# Patient Record
Sex: MALE | Race: WHITE | Employment: UNEMPLOYED | URBAN - METROPOLITAN AREA
[De-identification: names, ages, dates, MRNs, and addresses within clinical notes are randomized per-mention and may not be internally consistent; named-entity substitution may affect disease eponyms.]

---

## 2020-11-23 ENCOUNTER — OFFICE VISIT (OUTPATIENT)
Dept: PODIATRY | Age: 40
End: 2020-11-23
Payer: MEDICARE

## 2020-11-23 VITALS — WEIGHT: 190 LBS | HEIGHT: 72 IN | BODY MASS INDEX: 25.73 KG/M2

## 2020-11-23 PROCEDURE — 1036F TOBACCO NON-USER: CPT | Performed by: PODIATRIST

## 2020-11-23 PROCEDURE — G8484 FLU IMMUNIZE NO ADMIN: HCPCS | Performed by: PODIATRIST

## 2020-11-23 PROCEDURE — 99203 OFFICE O/P NEW LOW 30 MIN: CPT | Performed by: PODIATRIST

## 2020-11-23 PROCEDURE — G8419 CALC BMI OUT NRM PARAM NOF/U: HCPCS | Performed by: PODIATRIST

## 2020-11-23 PROCEDURE — G8427 DOCREV CUR MEDS BY ELIG CLIN: HCPCS | Performed by: PODIATRIST

## 2020-11-23 ASSESSMENT — ENCOUNTER SYMPTOMS
NAUSEA: 0
DIARRHEA: 0
SHORTNESS OF BREATH: 0
BACK PAIN: 0
COLOR CHANGE: 0

## 2020-11-23 NOTE — PROGRESS NOTES
or breaks in the skin noted to the bilateral lower extremities. There is no induration, subcutaneous nodules, or tightening of the skin noted to the bilateral.     Toenails 1-5 of the right foot do not present with thickness, elongation, discoloration, brittleness, subungual debris. Toenails 1-5 of the left foot do not present with thickness, elongation, discoloration, brittleness, subungual debris. Interdigital maceration absent to web spaces 1-4, Bilateral.     There are no preulcerative lesions noted to the right foot. There are no preulcerative lesions noted to the left foot. The skin to the bilateral feet is not thin and shiny. The skin to the bilateral feet is  warm, supple, and dry. Vascular: DP pulse of the right foot is  palpable. DP pulse of the left foot is  palpable. PT pulse of the right foot is  palpable. PT pulse of the left foot is  palpable. CFT is less than 3 secs to the digits of the right foot. CFT is less than 3 secs to the digits of the left foot. There is no edema noted to the bilateral foot or ankle. There is hair growth noted to the digits of the bilateral feet. There are no varicosities noted to the right foot/ankle. There are no varicosities noted to the left foot/ankle. Erythema is absent to the bilateral feet. Neurological: Reflexes are present to the right plantar foot and to the Achilles tendon. Reflexes are present to the left plantar foot and to the Achilles tendon. Epicritic sensation is  intact to the right foot. Epicritic sensation is  intact to the left foot. Musculoskeletal:  Muscle strength is +5/5 to all four muscle groups of the right lower extremity and +5/5 to all four muscle groups of the left lower extremity. There are no areas of subluxation, dislocation, or laxity noted to either lower extremity. Range of motion to the right ankle is  free of pain or grinding.      Range of motion to the left ankle is  free of pain or grinding. Range of motion to the right subtalar joint is  free of pain or grinding. Range of motion to the left subtalar joint is  free of pain or grinding. No abnormalities, asymmetries, or misalignments are seen between the extremities. Weightbearing evaluation does not reveal rearfoot eversion, medial prominence of the talar head, loss of the medial longitudinal arch height, and too many toes sign bilaterally. The lesser digits of the right foot are not contracted. The lesser digits of the left foot are not contracted. There is no prominence noted to the first metatarsal head without abduction of the hallux of the right foot. There is no prominence noted to the first metatarsal head without abduction of the hallux of the left foot. There is no pain with palpation to the plantar medial calcaneal tubercle of the right foot. There is no pain with palpation to the plantar central aspect of the right heel. There is no pain with palpation to the butch pedis of the right foot. There is no pain with medial to lateral compression of the right calcaneus. Tinel's sign is negative to the right tarsal tunnel. There is no erythema, calor, or open lesion noted to the right foot or ankle. Shoe examination was performed. Biomechanical Exam: normal bilaterally. With the patient standing, he bends forward at his waist with his legs extended and states that this is when he experiences pain to his heel. X-ray's taken: AP, Lateral, and Medial Oblique of the right ankle. Findings: No fracture or stress fracture is noted. Asessment: Patient is a 38 y.o. male with:    Diagnosis Orders   1. Tarsal tunnel syndrome of right side  XR ANKLE RIGHT (MIN 3 VIEWS)    Zita Amos MD, Orthopedic Surgery, [place]   2. Pain in right foot  XR ANKLE RIGHT (MIN 3 VIEWS)    Adrian Ellis MD, Orthopedic Surgery, [place]   3.  Lumbosacral radiculopathy  Russ Fountain MD, Orthopedic Surgery, [phone]:  1. Clinical evaluation of the patient. 2.  Patient informed that I believe his pain is due to his back and he was given a referral to orthopedics to evaluate this. 3. Contact office with any questions/problems/concerns. Return if symptoms worsen or fail to improve.    11/23/2020      Zeb Agrawal DPM

## 2020-11-30 ENCOUNTER — OFFICE VISIT (OUTPATIENT)
Dept: ORTHOPEDIC SURGERY | Age: 40
End: 2020-11-30
Payer: MEDICARE

## 2020-11-30 VITALS — TEMPERATURE: 96.8 F | WEIGHT: 190 LBS | HEIGHT: 72 IN | BODY MASS INDEX: 25.73 KG/M2

## 2020-11-30 PROCEDURE — G8427 DOCREV CUR MEDS BY ELIG CLIN: HCPCS | Performed by: PHYSICIAN ASSISTANT

## 2020-11-30 PROCEDURE — 1036F TOBACCO NON-USER: CPT | Performed by: PHYSICIAN ASSISTANT

## 2020-11-30 PROCEDURE — 99203 OFFICE O/P NEW LOW 30 MIN: CPT | Performed by: PHYSICIAN ASSISTANT

## 2020-11-30 PROCEDURE — G8419 CALC BMI OUT NRM PARAM NOF/U: HCPCS | Performed by: PHYSICIAN ASSISTANT

## 2020-11-30 PROCEDURE — G8484 FLU IMMUNIZE NO ADMIN: HCPCS | Performed by: PHYSICIAN ASSISTANT

## 2020-11-30 NOTE — PROGRESS NOTES
321 [facility], 20 [facility] [address], [address] [place] [address] Rd, [address] [facility]           Dept Phone: [phone]           Dept Fax:  [phone] 320 [facility]           Irma Poe          Dept Phone: [phone]           Dept Fax:  [phone]      Chief Compliant:  Chief Complaint   Patient presents with    New Patient     rt foot pain         History of Present Illness: This is a 38 y.o. male who presents to the clinic today for evaluation of 3-month history of right foot pain. Patient notes a \"stabbing \"pain to the medial aspect of the right foot just distal to the medial malleolus. He states this is typically only present when bending over at the waist with a extended knee. He states he is able to modify how he bends over such as bending his knee to improve the pain. However he states when the pain is onset he rates it as a 10/10 and states it is \"debilitating\". Patient denies any injury or trauma prior to the onset of his pain. He does note intermittent back pain however he does not have back pain related to this. He denies any numbness or tingling in the right lower extremity or foot itself. No known wounds or trauma to his knowledge. Patient was evaluated by his podiatrist, Dr. Clinton Carlson for this there was a question of lumbosacral radiculopathy versus tarsal tunnel syndrome and was referred here for further orthopedic evaluation. Patient denies any chronic back problems. He has never had any lumbar or right lower extremity surgeries. Patient is not currently taking any medication, physical therapy or had any injections for this problem. Past History:  No current outpatient medications on file.   No Known Allergies  Social History     Socioeconomic History    Marital status: Single     Spouse name: Not on file    Number of children: Not on file    Years of education: Not on file    Highest education level: Not on file   Occupational History    Not on file   Social Needs    Financial resource strain: Not on file    Food insecurity     Worry: Not on file     Inability: Not on file    Transportation needs     Medical: Not on file     Non-medical: Not on file   Tobacco Use    Smoking status: Never Smoker    Smokeless tobacco: Never Used   Substance and Sexual Activity    Alcohol use: No    Drug use: No    Sexual activity: Not on file   Lifestyle    Physical activity     Days per week: Not on file     Minutes per session: Not on file    Stress: Not on file   Relationships    Social connections     Talks on phone: Not on file     Gets together: Not on file     Attends Tenriism service: Not on file     Active member of club or organization: Not on file     Attends meetings of clubs or organizations: Not on file     Relationship status: Not on file    Intimate partner violence     Fear of current or ex partner: Not on file     Emotionally abused: Not on file     Physically abused: Not on file     Forced sexual activity: Not on file   Other Topics Concern    Not on file   Social History Narrative    Not on file     No past medical history on file. No past surgical history on file. No family history on file. Review of Systems   Constitutional: Negative for fever, chills, sweats. Eyes: Negative for changes in vision, or pain. HENT: Negative for ear ache, epistaxis, or sore throat. Respiratory/Cardio: Negative for Chest pain, palpitations, SOB, or cough. Gastrointestinal: Negative for abdominal pain, N/V/D. Genitourinary: Negative for dysuria, frequency, urgency, or hematuria. Neurological: Negative for headache, numbness, or weakness. Integumentary: Negative for rash, itching, laceration, or abrasion.    Musculoskeletal: Positive for New Patient (rt foot pain )       Physical Exam:  Constitutional: Patient is oriented to person, place, and time. Patient appears well-developed and well nourished. HENT: Negative otherwise noted  Head: Normocephalic and Atraumatic  Nose: Normal  Eyes: Conjunctivae and EOM are normal  Neck: Normal range of motion Neck supple. Respiratory/Cardio: Effort normal. No respiratory distress. Musculoskeletal:    Gait:  Normal. The patient can bear weight on the injured extremity. Tenderness:   absent   Edema:   absent. Back ROM:  flexion 100   Extension:   +30   Lateral Rotation:  90/90   Lateral Bendin/50   Leg Lengths:   Equal   Atrophy:    is absent   Pulses:  2+ and symmetric   Strength:  abductor 5/5; quadraceps 5/5; hamstrings 5/5; adductors 5/5; iliopsoas 5/5, anterior tibial 5/5; gastrosolues 5/5; EHL 5/5; peroneal posterior tibial 5/5   Straight Leg Raise:  normal/normal   Patellar Reflexes:  2+ bilaterally   Ankle Reflexes:  2+ bilaterally     Right Foot:  right Ankle/Foot        Tenderness: None   Swelling: None       Range of Motion:      Dorsiflexion: 25     Plantar Flexion: 40     Subtalar Inversion: 30     Eversion: 20       Muscle Strength:      Dorsiflexion:  5/5     Plantar Flexion:  5/5     Anterior Tibial:  5/5     Gastrosoleus:  5/5     Posterior Tibial:  5/5     Peroneal muscle:  5/5       Anterior Drawer:  Negative   Varus Tilt:  Negative     Sensation intact to light touch in all distal dermatomes. Neurological: Patient is alert and oriented to person, place, and time. Normal strenght. No sensory deficit. Skin: Skin is warm and dry  Psychiatric: Behavior is normal. Thought content normal.  Nursing note and vitals reviewed. Labs and Imaging:     XR taken today:  No results found. X-rays taken in clinic today and preliminarily reviewed by me:  AP and lumbar spine Demonstrate normal lumbar lordosis and osseous anatomy. There is no evidence of significant degenerative changes, spondylitic deformity or compression deformity.   Unremarkable 2 views of the lumbar spine. Hip joints are visualized on AP view and appear within normal limits. Orders Placed This Encounter   Procedures    XR LUMBAR SPINE (2-3 VIEWS)     Standing Status:   Future     Standing Expiration Date:   11/30/2021    EMG     Standing Status:   Future     Standing Expiration Date:   11/30/2021     Order Specific Question:   Which body part? Answer:   right lower extremity       Assessment and Plan:  1. Right lumbar radiculopathy      Differential:   Differential includes lumbosacral radiculopathy, peripheral neuropathy, tarsal tunnel syndrome, ankle sprain, foot/ankle arthritis. PLAN:  This is a 38 y.o. male who presents to the clinic today for evaluation of intermittent right foot pain. 1.  Patient was referred here for evaluation by his podiatrist due to question of lumbosacral radiculopathy based on his right foot pain. 2.  Patient has a negative lumbar examination today and negative lumbar radiographs taken in clinic today. 3.  Recommend further diagnostic evaluation with an EMG of the right lower extremity to evaluate for possible lumbosacral radiculopathy versus tibial neuropathy. Please note that this chart was generated using voice recognition Dragon dictation software. Although every effort was made to ensure the accuracy of this automated transcription, some errors in transcription may have occurred.

## 2021-01-11 DIAGNOSIS — M54.17 LUMBOSACRAL RADICULOPATHY: Primary | ICD-10-CM

## 2021-01-15 ENCOUNTER — HOSPITAL ENCOUNTER (OUTPATIENT)
Age: 41
Discharge: HOME | End: 2021-01-15
Payer: MEDICARE

## 2021-01-15 ENCOUNTER — HOSPITAL ENCOUNTER (OUTPATIENT)
Age: 41
Discharge: HOME | End: 2021-01-17
Payer: MEDICARE

## 2021-01-15 ENCOUNTER — HOSPITAL ENCOUNTER (OUTPATIENT)
Dept: GENERAL RADIOLOGY | Age: 41
Discharge: HOME | End: 2021-01-17
Payer: MEDICARE

## 2021-01-15 DIAGNOSIS — M50.30 DDD (DEGENERATIVE DISC DISEASE), CERVICAL: ICD-10-CM

## 2021-01-15 DIAGNOSIS — M54.50 LOW BACK PAIN, UNSPECIFIED BACK PAIN LATERALITY, UNSPECIFIED CHRONICITY, UNSPECIFIED WHETHER SCIATICA PRESENT: ICD-10-CM

## 2021-01-15 LAB
ESTIMATED AVERAGE GLUCOSE: 111 MG/DL
FOLATE: 8.2 NG/ML
HBA1C MFR BLD: 5.5 % (ref 4–6)
RHEUMATOID FACTOR: <10 IU/ML
SEDIMENTATION RATE, ERYTHROCYTE: 5 MM (ref 0–15)
TSH SERPL DL<=0.05 MIU/L-ACNC: 3.92 MIU/L (ref 0.3–5)
VITAMIN B-12: 239 PG/ML (ref 232–1245)

## 2021-01-15 PROCEDURE — 72114 X-RAY EXAM L-S SPINE BENDING: CPT

## 2021-01-15 PROCEDURE — 85652 RBC SED RATE AUTOMATED: CPT

## 2021-01-15 PROCEDURE — 82746 ASSAY OF FOLIC ACID SERUM: CPT

## 2021-01-15 PROCEDURE — 84155 ASSAY OF PROTEIN SERUM: CPT

## 2021-01-15 PROCEDURE — 36415 COLL VENOUS BLD VENIPUNCTURE: CPT

## 2021-01-15 PROCEDURE — 82607 VITAMIN B-12: CPT

## 2021-01-15 PROCEDURE — 84443 ASSAY THYROID STIM HORMONE: CPT

## 2021-01-15 PROCEDURE — 84165 PROTEIN E-PHORESIS SERUM: CPT

## 2021-01-15 PROCEDURE — 86038 ANTINUCLEAR ANTIBODIES: CPT

## 2021-01-15 PROCEDURE — 83036 HEMOGLOBIN GLYCOSYLATED A1C: CPT

## 2021-01-15 PROCEDURE — 86431 RHEUMATOID FACTOR QUANT: CPT

## 2021-01-18 LAB — ANTI-NUCLEAR ANTIBODY (ANA): NEGATIVE

## 2021-01-19 LAB
ALBUMIN (CALCULATED): 4.5 G/DL (ref 3.2–5.2)
ALBUMIN PERCENT: 62 % (ref 45–65)
ALPHA 1 PERCENT: 2 % (ref 3–6)
ALPHA 2 PERCENT: 8 % (ref 6–13)
ALPHA-1-GLOBULIN: 0.2 G/DL (ref 0.1–0.4)
ALPHA-2-GLOBULIN: 0.6 G/DL (ref 0.5–0.9)
BETA GLOBULIN: 0.8 G/DL (ref 0.5–1.1)
BETA PERCENT: 11 % (ref 11–19)
GAMMA GLOBULIN %: 16 % (ref 9–20)
GAMMA GLOBULIN: 1.2 G/DL (ref 0.5–1.5)
PATHOLOGIST: ABNORMAL
PROTEIN ELECTROPHORESIS, SERUM: ABNORMAL
TOTAL PROT. SUM,%: 99 % (ref 98–102)
TOTAL PROT. SUM: 7.3 G/DL (ref 6.3–8.2)
TOTAL PROTEIN: 7.3 G/DL (ref 6.4–8.3)

## 2021-03-24 ENCOUNTER — HOSPITAL ENCOUNTER (OUTPATIENT)
Dept: GENERAL RADIOLOGY | Age: 41
Discharge: HOME | End: 2021-03-26
Payer: MEDICARE

## 2021-03-24 ENCOUNTER — HOSPITAL ENCOUNTER (OUTPATIENT)
Age: 41
Discharge: HOME | End: 2021-03-26
Payer: MEDICARE

## 2021-03-24 ENCOUNTER — HOSPITAL ENCOUNTER (OUTPATIENT)
Age: 41
Discharge: HOME | End: 2021-03-24
Payer: MEDICARE

## 2021-03-24 DIAGNOSIS — R07.9 CHEST PAIN, UNSPECIFIED TYPE: ICD-10-CM

## 2021-03-24 LAB
ABSOLUTE EOS #: 0.8 K/UL (ref 0–0.4)
ABSOLUTE IMMATURE GRANULOCYTE: ABNORMAL K/UL (ref 0–0.3)
ABSOLUTE LYMPH #: 3.4 K/UL (ref 1–4.8)
ABSOLUTE MONO #: 0.6 K/UL (ref 0.1–1.3)
ALBUMIN SERPL-MCNC: 4 G/DL (ref 3.5–5.2)
ALBUMIN/GLOBULIN RATIO: ABNORMAL (ref 1–2.5)
ALP BLD-CCNC: 86 U/L (ref 40–129)
ALT SERPL-CCNC: 36 U/L (ref 5–41)
ANION GAP SERPL CALCULATED.3IONS-SCNC: 10 MMOL/L (ref 9–17)
AST SERPL-CCNC: 26 U/L
BASOPHILS # BLD: 1 % (ref 0–2)
BASOPHILS ABSOLUTE: 0.1 K/UL (ref 0–0.2)
BILIRUB SERPL-MCNC: 0.24 MG/DL (ref 0.3–1.2)
BILIRUBIN URINE: NEGATIVE
BUN BLDV-MCNC: 14 MG/DL (ref 6–20)
BUN/CREAT BLD: ABNORMAL (ref 9–20)
CALCIUM SERPL-MCNC: 8.8 MG/DL (ref 8.6–10.4)
CHLORIDE BLD-SCNC: 106 MMOL/L (ref 98–107)
CHOLESTEROL/HDL RATIO: 6.5
CHOLESTEROL: 194 MG/DL
CO2: 24 MMOL/L (ref 20–31)
COLOR: YELLOW
COMMENT UA: NORMAL
CREAT SERPL-MCNC: 0.84 MG/DL (ref 0.7–1.2)
D-DIMER QUANTITATIVE: <0.27 MG/L FEU (ref 0–0.59)
DIFFERENTIAL TYPE: ABNORMAL
EKG ATRIAL RATE: 59 BPM
EKG P AXIS: 57 DEGREES
EKG P-R INTERVAL: 148 MS
EKG Q-T INTERVAL: 396 MS
EKG QRS DURATION: 88 MS
EKG QTC CALCULATION (BAZETT): 392 MS
EKG R AXIS: 70 DEGREES
EKG T AXIS: 65 DEGREES
EKG VENTRICULAR RATE: 59 BPM
EOSINOPHILS RELATIVE PERCENT: 8 % (ref 0–4)
GFR AFRICAN AMERICAN: >60 ML/MIN
GFR NON-AFRICAN AMERICAN: >60 ML/MIN
GFR SERPL CREATININE-BSD FRML MDRD: ABNORMAL ML/MIN/{1.73_M2}
GFR SERPL CREATININE-BSD FRML MDRD: ABNORMAL ML/MIN/{1.73_M2}
GLUCOSE BLD-MCNC: 115 MG/DL (ref 70–99)
GLUCOSE URINE: NEGATIVE
HCT VFR BLD CALC: 44.1 % (ref 41–53)
HDLC SERPL-MCNC: 30 MG/DL
HEMOGLOBIN: 14.9 G/DL (ref 13.5–17.5)
IMMATURE GRANULOCYTES: ABNORMAL %
KETONES, URINE: NEGATIVE
LDL CHOLESTEROL: 101 MG/DL (ref 0–130)
LEUKOCYTE ESTERASE, URINE: NEGATIVE
LYMPHOCYTES # BLD: 32 % (ref 24–44)
MCH RBC QN AUTO: 30.8 PG (ref 26–34)
MCHC RBC AUTO-ENTMCNC: 33.9 G/DL (ref 31–37)
MCV RBC AUTO: 91.1 FL (ref 80–100)
MONOCYTES # BLD: 6 % (ref 1–7)
NITRITE, URINE: NEGATIVE
NRBC AUTOMATED: ABNORMAL PER 100 WBC
PDW BLD-RTO: 13.1 % (ref 11.5–14.9)
PH UA: 5.5 (ref 5–8)
PLATELET # BLD: 276 K/UL (ref 150–450)
PLATELET ESTIMATE: ABNORMAL
PMV BLD AUTO: 7.4 FL (ref 6–12)
POTASSIUM SERPL-SCNC: 4.2 MMOL/L (ref 3.7–5.3)
PROTEIN UA: NEGATIVE
RBC # BLD: 4.85 M/UL (ref 4.5–5.9)
RBC # BLD: ABNORMAL 10*6/UL
SEG NEUTROPHILS: 53 % (ref 36–66)
SEGMENTED NEUTROPHILS ABSOLUTE COUNT: 5.6 K/UL (ref 1.3–9.1)
SODIUM BLD-SCNC: 140 MMOL/L (ref 135–144)
SPECIFIC GRAVITY UA: 1.02 (ref 1–1.03)
TOTAL PROTEIN: 7.1 G/DL (ref 6.4–8.3)
TRIGL SERPL-MCNC: 317 MG/DL
TROPONIN INTERP: NORMAL
TROPONIN T: NORMAL NG/ML
TROPONIN, HIGH SENSITIVITY: <6 NG/L (ref 0–22)
TSH SERPL DL<=0.05 MIU/L-ACNC: 2.74 MIU/L (ref 0.3–5)
TURBIDITY: CLEAR
URINE HGB: NEGATIVE
UROBILINOGEN, URINE: NORMAL
VLDLC SERPL CALC-MCNC: ABNORMAL MG/DL (ref 1–30)
WBC # BLD: 10.5 K/UL (ref 3.5–11)
WBC # BLD: ABNORMAL 10*3/UL

## 2021-03-24 PROCEDURE — 93010 ELECTROCARDIOGRAM REPORT: CPT | Performed by: INTERNAL MEDICINE

## 2021-03-24 PROCEDURE — 84443 ASSAY THYROID STIM HORMONE: CPT

## 2021-03-24 PROCEDURE — 71046 X-RAY EXAM CHEST 2 VIEWS: CPT

## 2021-03-24 PROCEDURE — 93005 ELECTROCARDIOGRAM TRACING: CPT | Performed by: INTERNAL MEDICINE

## 2021-03-24 PROCEDURE — 85379 FIBRIN DEGRADATION QUANT: CPT

## 2021-03-24 PROCEDURE — 84484 ASSAY OF TROPONIN QUANT: CPT

## 2021-03-24 PROCEDURE — 80061 LIPID PANEL: CPT

## 2021-03-24 PROCEDURE — 85025 COMPLETE CBC W/AUTO DIFF WBC: CPT

## 2021-03-24 PROCEDURE — 36415 COLL VENOUS BLD VENIPUNCTURE: CPT

## 2021-03-24 PROCEDURE — 81003 URINALYSIS AUTO W/O SCOPE: CPT

## 2021-03-24 PROCEDURE — 80053 COMPREHEN METABOLIC PANEL: CPT

## 2021-04-05 ENCOUNTER — HOSPITAL ENCOUNTER (OUTPATIENT)
Dept: NON INVASIVE DIAGNOSTICS | Age: 41
Discharge: HOME | End: 2021-04-05
Payer: MEDICARE

## 2021-04-05 ENCOUNTER — HOSPITAL ENCOUNTER (OUTPATIENT)
Dept: NUCLEAR MEDICINE | Age: 41
Discharge: HOME | End: 2021-04-07
Payer: MEDICARE

## 2021-04-05 VITALS — WEIGHT: 210 LBS | HEIGHT: 72 IN | BODY MASS INDEX: 28.44 KG/M2

## 2021-04-05 DIAGNOSIS — R07.9 CHEST PAIN, UNSPECIFIED TYPE: ICD-10-CM

## 2021-04-05 LAB
LV EF: 55 %
LV EF: 63 %
LVEF MODALITY: NORMAL
LVEF MODALITY: NORMAL

## 2021-04-05 PROCEDURE — 2580000003 HC RX 258: Performed by: INTERNAL MEDICINE

## 2021-04-05 PROCEDURE — 6360000002 HC RX W HCPCS: Performed by: INTERNAL MEDICINE

## 2021-04-05 PROCEDURE — A9500 TC99M SESTAMIBI: HCPCS | Performed by: INTERNAL MEDICINE

## 2021-04-05 PROCEDURE — 93017 CV STRESS TEST TRACING ONLY: CPT

## 2021-04-05 PROCEDURE — 78452 HT MUSCLE IMAGE SPECT MULT: CPT

## 2021-04-05 PROCEDURE — 3430000000 HC RX DIAGNOSTIC RADIOPHARMACEUTICAL: Performed by: INTERNAL MEDICINE

## 2021-04-05 PROCEDURE — 93306 TTE W/DOPPLER COMPLETE: CPT

## 2021-04-05 RX ORDER — METOPROLOL TARTRATE 5 MG/5ML
5 INJECTION INTRAVENOUS EVERY 5 MIN PRN
Status: DISCONTINUED | OUTPATIENT
Start: 2021-04-05 | End: 2021-04-05 | Stop reason: HOSPADM

## 2021-04-05 RX ORDER — ALBUTEROL SULFATE 90 UG/1
2 AEROSOL, METERED RESPIRATORY (INHALATION) PRN
Status: DISCONTINUED | OUTPATIENT
Start: 2021-04-05 | End: 2021-04-05 | Stop reason: HOSPADM

## 2021-04-05 RX ORDER — AMINOPHYLLINE DIHYDRATE 25 MG/ML
50 INJECTION, SOLUTION INTRAVENOUS PRN
Status: DISCONTINUED | OUTPATIENT
Start: 2021-04-05 | End: 2021-04-05 | Stop reason: HOSPADM

## 2021-04-05 RX ORDER — SODIUM CHLORIDE 0.9 % (FLUSH) 0.9 %
10 SYRINGE (ML) INJECTION PRN
Status: DISCONTINUED | OUTPATIENT
Start: 2021-04-05 | End: 2021-04-08 | Stop reason: HOSPADM

## 2021-04-05 RX ORDER — SODIUM CHLORIDE 9 MG/ML
500 INJECTION, SOLUTION INTRAVENOUS CONTINUOUS PRN
Status: DISCONTINUED | OUTPATIENT
Start: 2021-04-05 | End: 2021-04-05 | Stop reason: HOSPADM

## 2021-04-05 RX ORDER — SODIUM CHLORIDE 0.9 % (FLUSH) 0.9 %
10 SYRINGE (ML) INJECTION PRN
Status: DISCONTINUED | OUTPATIENT
Start: 2021-04-05 | End: 2021-04-05 | Stop reason: HOSPADM

## 2021-04-05 RX ORDER — ATROPINE SULFATE 0.1 MG/ML
0.5 INJECTION INTRAVENOUS EVERY 5 MIN PRN
Status: DISCONTINUED | OUTPATIENT
Start: 2021-04-05 | End: 2021-04-05 | Stop reason: HOSPADM

## 2021-04-05 RX ORDER — NITROGLYCERIN 0.4 MG/1
0.4 TABLET SUBLINGUAL EVERY 5 MIN PRN
Status: DISCONTINUED | OUTPATIENT
Start: 2021-04-05 | End: 2021-04-05 | Stop reason: HOSPADM

## 2021-04-05 RX ADMIN — TETRAKIS(2-METHOXYISOBUTYLISOCYANIDE)COPPER(I) TETRAFLUOROBORATE 34.5 MILLICURIE: 1 INJECTION, POWDER, LYOPHILIZED, FOR SOLUTION INTRAVENOUS at 09:45

## 2021-04-05 RX ADMIN — REGADENOSON 0.4 MG: 0.08 INJECTION, SOLUTION INTRAVENOUS at 09:43

## 2021-04-05 RX ADMIN — TETRAKIS(2-METHOXYISOBUTYLISOCYANIDE)COPPER(I) TETRAFLUOROBORATE 11.5 MILLICURIE: 1 INJECTION, POWDER, LYOPHILIZED, FOR SOLUTION INTRAVENOUS at 08:08

## 2021-04-05 RX ADMIN — SODIUM CHLORIDE, PRESERVATIVE FREE 10 ML: 5 INJECTION INTRAVENOUS at 09:32

## 2021-04-05 RX ADMIN — Medication 10 ML: at 08:08

## 2021-04-07 NOTE — PROCEDURES
[address] [place], [place] [address]                              CARDIAC STRESS TEST    PATIENT NAME: Ellyn Bonner                  :        1980  MED REC NO:   [id number]                              ROOM:  ACCOUNT NO:   [de-identified]                           ADMIT DATE: 2021  PROVIDER:     Belkis Bain    DATE OF STUDY:  2021    TEST TYPE: LEXISCAN CARDIOLYTE STRESS TEST  INDICATION: CHEST PAIN  REFERRING PHYSICIAN: DAPHNE COVINGTON    RESTING HEART RATE: 74 BEATS PER MINUTE  RESTING BLOOD PRESSURE: 134/87    MEDICATION(S) GIVEN: 0.4MG IV LEXISCAN  REASON FOR TERMINATION: MEDICATION INFUSION COMPLETE    RESTING EKG: NORMAL, SINUS RHYTHM, 61 BEATS PER MINUTE  STRESS HEART RESPONSE: NORMAL RESPONSE  BLOOD PRESSURE RESPONSE: HYPER  STRESS EKGs: ABNORMAL  CHEST DISCOMFORT: NO PAIN DURING STRESS  ISCHEMIC EKG CHANGES: NONE    EKG IMPRESSION: ELECTROCARDIOGRAPHICALLY NEGATIVE LEXISCAN STRESS TEST. RADIOISOTOPE RESULTS TO FOLLOW FROM THE DEPARTMENT OF NUCLEAR MEDICINE.     Tita Bravo    D: 2021 15:20:58       T: 2021 15:24:42     AS/MADDI  Job#: [id number]     Doc#: Unknown    CC:    (Retain this field even if not dictated or not decipherable)

## 2022-07-08 ENCOUNTER — OFFICE VISIT (OUTPATIENT)
Dept: PODIATRY | Age: 42
End: 2022-07-08
Payer: MEDICARE

## 2022-07-08 VITALS — HEIGHT: 72 IN | BODY MASS INDEX: 27.77 KG/M2 | WEIGHT: 205 LBS

## 2022-07-08 DIAGNOSIS — M79.675 PAIN IN TOE OF LEFT FOOT: ICD-10-CM

## 2022-07-08 DIAGNOSIS — M79.674 PAIN IN TOE OF RIGHT FOOT: ICD-10-CM

## 2022-07-08 DIAGNOSIS — B35.1 ONYCHOMYCOSIS OF TOENAIL: ICD-10-CM

## 2022-07-08 DIAGNOSIS — L60.0 ONYCHOCRYPTOSIS: Primary | ICD-10-CM

## 2022-07-08 PROCEDURE — G8419 CALC BMI OUT NRM PARAM NOF/U: HCPCS | Performed by: PODIATRIST

## 2022-07-08 PROCEDURE — 99213 OFFICE O/P EST LOW 20 MIN: CPT | Performed by: PODIATRIST

## 2022-07-08 PROCEDURE — 11720 DEBRIDE NAIL 1-5: CPT | Performed by: PODIATRIST

## 2022-07-08 PROCEDURE — G8427 DOCREV CUR MEDS BY ELIG CLIN: HCPCS | Performed by: PODIATRIST

## 2022-07-08 PROCEDURE — 1036F TOBACCO NON-USER: CPT | Performed by: PODIATRIST

## 2022-07-08 NOTE — PROGRESS NOTES
[address] [facility] Podiatry  Return Patient Progress Note    Subjective: Dragan Muro 36 y.o. male that presents with chief complaint of ingrown nail to the left great toenail. Chief Complaint   Patient presents with    Ingrown Toenail     left hallux     Nail Problem     discolored right hallux toenail     Patient states that this has been present for over one month. Pain is rated 2 out of 10 and is described as intermittent. Patient also complains of discoloration to the right great toenail. Review of Systems   Constitutional: Negative for activity change, appetite change, chills, diaphoresis, fatigue and fever. Respiratory: Negative for shortness of breath. Cardiovascular: Negative for leg swelling. Gastrointestinal: Negative for diarrhea and nausea. Endocrine: Negative for cold intolerance, heat intolerance and polyuria. Musculoskeletal: Negative for arthralgias, back pain, gait problem, joint swelling and myalgias. Skin: Negative for color change, pallor, rash and wound. Allergic/Immunologic: Negative for environmental allergies and food allergies. Neurological: Negative for dizziness, weakness, light-headedness and numbness. Hematological: Does not bruise/bleed easily. Psychiatric/Behavioral: Negative for behavioral problems, confusion and self-injury. The patient is not nervous/anxious. Objective: Clinical evaluation of the patient reveals mild incurvation to the left hallux toenail on both borders. There is no ingrowth noted to either border of the left hallux toenail. There is mild pain with palpation to the left hallux toenail. Debridement of this nail in a slant back fashion relieves the patient's pain. There are no signs of bacterial infection noted to the left hallux. The right hallux toenail presents with thickness, discoloration, brittleness, and subungual debris. There is pain with palpation and debridement of the nail.  No signs of bacterial infection are noted to the right hallux. Assessment:    Diagnosis Orders   1. Onychocryptosis  VA DEBRIDEMENT OF NAIL(S), 1-5   2. Onychomycosis of toenail  ALT    AST    CBC   3. Pain in toe of left foot  ALT    AST    CBC    VA DEBRIDEMENT OF NAIL(S), 1-5   4. Pain in toe of right foot  ALT    AST    CBC         Plan: 1. Clinical evaluation of the patient. 2. The left hallux toenail was debrided along both borders with a nail nipper and . I recommended oral Lamisil for treatment of the patients fungal toenails. The patient was informed that this is a 90 day therapy and that there is no guarantee that the medication will completely rid the patient of fungus to the toenails. Further, the patient was informed that even if the medication does completely resolve the nail fungus, it can return once they have stopped taking it. The patient states that they understand this. The patient was given an order for blood work to evaluate their liver function. The patient was informed that if their liver function is not adequate, then they will not be a candidate for oral Lamisil. If their liver function is adequate, then the patient will be instructed to fill their prescription for 30 days of Lamisil. The patient was instructed to have a second blood test taken when they only have about 5 tablets left to ensure that the medication has not affected their liver function. If this test is acceptable, then the patient will be instructed to continue with the remaining 60 days of Lamisil. If the medication has caused an increase in the patient's liver enzymes, then they will be instructed to discontinue taking it and will be instructed to go to their PCP for evaluation. The patient states that they understand this. Finally, it was recommended that the patient take a photograph of the toenails for future reference. 3. Return in about 3 months (around 10/8/2022) for Painful fungal nails.    7/8/2022      Simin Muñoz DPM

## 2022-07-11 ASSESSMENT — ENCOUNTER SYMPTOMS
COLOR CHANGE: 0
SHORTNESS OF BREATH: 0
NAUSEA: 0
DIARRHEA: 0
BACK PAIN: 0

## 2023-05-04 ENCOUNTER — OFFICE VISIT (OUTPATIENT)
Dept: ORTHOPEDIC SURGERY | Age: 43
End: 2023-05-04
Payer: MEDICAID

## 2023-05-04 VITALS — HEIGHT: 72 IN | BODY MASS INDEX: 27.77 KG/M2 | RESPIRATION RATE: 18 BRPM | WEIGHT: 205 LBS

## 2023-05-04 DIAGNOSIS — M79.641 RIGHT HAND PAIN: Primary | ICD-10-CM

## 2023-05-04 DIAGNOSIS — M65.4 DE QUERVAIN'S TENOSYNOVITIS, RIGHT: ICD-10-CM

## 2023-05-04 PROCEDURE — 99204 OFFICE O/P NEW MOD 45 MIN: CPT | Performed by: PHYSICIAN ASSISTANT

## 2023-05-04 NOTE — PROGRESS NOTES
321 [facility], [address] [place] [address] Road [address] [place] [address], [address] [place] [address], [address] [facility]           Dept Phone: [phone]           Dept Fax:  [phone] 320 [facility]           Irma Poe          Dept Phone: [phone]           Dept Fax:  [phone]      Chief Compliant:  Chief Complaint   Patient presents with    Hand Pain     Right        History of Present Illness: This is a 36 y.o. male who presents to the clinic today for evaluation of evaluation of chronic right hand and wrist pain. Patient reports pain has been present for a few years but has progressively worsened over the last 6 months or so. 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 Localized pain most severely to the dorsal aspect of the right hand seems to be greatest over the dorsal radial wrist which especially is aggravated by thumb extension and gripping activities. He denies any numbness or tingling no significant nocturnal pain really only has significant pain at work when he overdoes it. He does note multiple trauma to the right hand in the past including several punch injuries growing up but to his knowledge never was found to have a fracture no history of surgery. Past History:  No current outpatient medications on file.   No Known Allergies  Social History     Socioeconomic History    Marital status: Single     Spouse name: Not on file    Number of children: Not on file    Years of education: Not on file    Highest education level: Not on file   Occupational History    Not on file   Tobacco Use    Smoking status: Never    Smokeless tobacco: Never   Substance and Sexual Activity    Alcohol use: No    Drug use: No    Sexual activity: Not on file   Other Topics Concern

## 2023-05-09 ENCOUNTER — HOSPITAL ENCOUNTER (OUTPATIENT)
Dept: OCCUPATIONAL THERAPY | Age: 43
Setting detail: THERAPIES SERIES
Discharge: HOME | End: 2023-05-09
Payer: MEDICAID

## 2023-05-09 PROCEDURE — 97165 OT EVAL LOW COMPLEX 30 MIN: CPT

## 2023-05-09 PROCEDURE — 97110 THERAPEUTIC EXERCISES: CPT

## 2023-05-09 ASSESSMENT — 9 HOLE PEG TEST
TESTTIME_SECONDS: 19
TEST_RESULT: FUNCTIONAL
TESTTIME_SECONDS: 20
TEST_RESULT: FUNCTIONAL

## 2023-05-09 ASSESSMENT — PAIN SCALES - GENERAL: PAINLEVEL_OUTOF10: 7

## 2023-05-09 ASSESSMENT — PAIN DESCRIPTION - DESCRIPTORS: DESCRIPTORS: SHARP

## 2023-05-09 ASSESSMENT — PAIN DESCRIPTION - PAIN TYPE: TYPE: ACUTE PAIN

## 2023-05-09 ASSESSMENT — PAIN DESCRIPTION - ORIENTATION: ORIENTATION: RIGHT

## 2023-05-09 ASSESSMENT — PAIN DESCRIPTION - LOCATION: LOCATION: WRIST;HAND

## 2023-05-09 NOTE — PROGRESS NOTES
range of motion. Performance Deficits/Impairments: Decreased ROM, Decreased strength, Decreased coordination, Decreased high-level IADLs, Decreased ADL status (ROM/strength WFL but painful)    Statement of Medical Necessity: Occupational Therapy is both indicated and medically necessary as outlined in the POC to increase the likelihood of meeting the functionally related goals stated below. Patient's Activity Tolerance: Patient tolerated evaluation without incident      Patient's rehabilitation potential/prognosis is considered to be: Good    Factors which may impact rehabilitation potential include: None  Measures taken to address barrier(s): N/A     GOALS     Patient Goal(s): Patient goals : \"To figure out the pain\"  Short Term Goals Completed by 4-6 visits Goal Status   Pt will complete full ROM of R wrist/hand with a pain rating of <5/10. Pt will measure R  of 100# with a pain rating of <5/10. Pt will increase active flexion of R MCPs (index, middle, and ring fingers) to 80 degrees and active extension of R wrist to 60 degrees. Pt will increase R lateral pinch to 28#. Pt will demo improved coordination of R hand as evidenced by completing 9-hole peg test in <20 seconds without dropping any pegs. Long Term Goals Completed by 6-8 visits Goal Status   Pt will demo IND with HEP. Using UEFI, pt will report increased overall function as evidenced by a score of 75+ with no rating lower than a 3 - able to complete with a little bit of difficulty. Pt will report decreased overall pain in R wrist/hand to 0-2/10 during functional tasks.                                       TREATMENT PLAN   OT Equipment Recommendations  Equipment Needed: No   REQUIRES OT FOLLOW-UP: Yes  Type: Outpatient    Pt. and/or family actively involved in establishing Plan of Care and Goals: Yes   Patient/ Caregiver education and instruction: OT Role, Plan of Care, Home Exercise Program,

## 2023-05-11 ENCOUNTER — HOSPITAL ENCOUNTER (OUTPATIENT)
Dept: OCCUPATIONAL THERAPY | Age: 43
Setting detail: THERAPIES SERIES
Discharge: HOME | End: 2023-05-11
Payer: MEDICAID

## 2023-05-11 PROCEDURE — 97140 MANUAL THERAPY 1/> REGIONS: CPT

## 2023-05-11 PROCEDURE — 97110 THERAPEUTIC EXERCISES: CPT

## 2023-05-11 ASSESSMENT — PAIN SCALES - GENERAL: PAINLEVEL_OUTOF10: 7

## 2023-05-11 ASSESSMENT — PAIN DESCRIPTION - DESCRIPTORS: DESCRIPTORS: ACHING

## 2023-05-11 ASSESSMENT — PAIN DESCRIPTION - PAIN TYPE: TYPE: ACUTE PAIN

## 2023-05-11 ASSESSMENT — PAIN DESCRIPTION - ORIENTATION: ORIENTATION: RIGHT

## 2023-05-11 ASSESSMENT — PAIN DESCRIPTION - LOCATION: LOCATION: HAND;WRIST

## 2023-05-15 ENCOUNTER — HOSPITAL ENCOUNTER (OUTPATIENT)
Dept: OCCUPATIONAL THERAPY | Age: 43
Setting detail: THERAPIES SERIES
Discharge: HOME | End: 2023-05-15
Payer: MEDICAID

## 2023-05-15 PROCEDURE — 97035 APP MDLTY 1+ULTRASOUND EA 15: CPT

## 2023-05-15 PROCEDURE — 97140 MANUAL THERAPY 1/> REGIONS: CPT

## 2023-05-15 PROCEDURE — 97110 THERAPEUTIC EXERCISES: CPT

## 2023-05-15 ASSESSMENT — PAIN DESCRIPTION - PAIN TYPE: TYPE: ACUTE PAIN

## 2023-05-15 ASSESSMENT — PAIN DESCRIPTION - DESCRIPTORS: DESCRIPTORS: ACHING

## 2023-05-15 ASSESSMENT — PAIN DESCRIPTION - LOCATION: LOCATION: WRIST;HAND

## 2023-05-15 ASSESSMENT — PAIN SCALES - GENERAL: PAINLEVEL_OUTOF10: 9

## 2023-05-15 ASSESSMENT — PAIN DESCRIPTION - ORIENTATION: ORIENTATION: RIGHT

## 2023-05-15 NOTE — PROGRESS NOTES
massage rt hand/wrist, rt thumb IP blocking exercises flex/extension 2 sets 10 , rt thumb MP blocking flex/extension 2 sets 10x  OT Exercise 4: rt wrist rom/stretch pyloball on table : 2 sets 10x  roll forward/back (flex/extension), circles cw & ccw  OT Exercise 5: 2# pyloball rt  wrist  2 sets 10x ( wrist flex/extension palm down, wrist flex/extension palm up,supination /pronation  OT Exercise 6: press cone in putty  using rt hand 20x (hold small end cone,hold large end cone)  OT Exercise 7: yellow therapy ball : press into sides with both hands 3 sets 10x  OT Exercise 8: velcro  board (1\" x1\" squares with loops) - using rt hand remove all 32 pieces  with 3 jaw grasp then place pieces back on board. OT Exercise 9: metal hand gripper set at 35#, 3 sets 10x rt hand gripping. Limitations addressed: Mobility, Strength (rom)  Limitations addressed: rt hand/wrist  Therapist provided: Verbal cuing  Therapist provided: Demo  Progressed: Complexity of movement  Progressed: Begun pyloball, metal hand gripper, & cone in putty for rt hand/wrist strengthening. Progressed to therapy ball exercise for wrist stabilization. Velcro  board to improve pinch strength. Patient Education:   OT instructed pt to pace with therapy exercises. ASSESSMENT     Assessment: Assessment: Pt reports pain 5-6 rt hand(dorsal side, thumb). Ultrasound to decrease rt thumb/wrist pain. Tx includes massage, PROM, tendon gliding. Progressed pt with rt hand/wrist strengthening with cues to pace with exercises. .See OT exercises for details. Pt will benefit from continued skilled OT to provide ultrasound, manual therapy, & therapeutic exercises to decrease rt hand/wrist pain,improve rt hand/wrist motion, fine motor dexerity & hand strength for daily activities.   Performance deficits / Impairments: Decreased ROM, Decreased strength, Decreased coordination, Decreased high-level IADLs, Decreased ADL

## 2023-05-18 ENCOUNTER — HOSPITAL ENCOUNTER (OUTPATIENT)
Dept: OCCUPATIONAL THERAPY | Age: 43
Setting detail: THERAPIES SERIES
Discharge: HOME | End: 2023-05-18
Payer: MEDICAID

## 2023-05-18 PROCEDURE — 97110 THERAPEUTIC EXERCISES: CPT

## 2023-05-18 PROCEDURE — 97140 MANUAL THERAPY 1/> REGIONS: CPT

## 2023-05-18 NOTE — FLOWSHEET NOTE
[] [place]       Occupational Therapy            1st floor       [address], [place]         Phone: [phone]       Fax: [phone]  [x] [facility] ([facility]) @ [facility]  [address] [facility] [address]  [place], [place] [facility]  Phone [phone]  Fax [phone]     Occupational Therapy Daily Treatment Note    Date:  2023  Patient Name:  Dwain Felty    :  1980  MRN: [id number]  Physician: Gonzalo Morfinma     Insurance: [facility] MEDICAID  Diagnosis: Right hand pain [M79.641], De Quervain's tenosynovitis, right [M65.4] R hand pain M79.641; De Quervain's tenosynovitis, right M65. Onset Date:    Next Dr. Lazaro Dunn: TBD  Visit# / total visits: 3/8; Progress note for Medicare patient due at visit 8-9    Cancels/No Shows: 0/0      Subjective:    Pain:  [x] Yes  [] No Location: Dorsum of R hand   Pain Rating: (0-10 scale) 5/10  Pain altered Tx:  [x] No  [] Yes  Action:  Pt Comments: Pt reports pn is a little bit better but is still having a good amount. Objective:  Modalities:  Precautions:  Exercises:  EXERCISE    REPS/     TIME  WEIGHT/    LEVEL COMMENTS Completed   Progressive extensor wad stretching  5x10 seconds AROM  X   Eccentric wrist curl 3x10 2lbs  X   Pronation/supination  3x10 Hammer   X   Flexbar   3x10 red Wrist Flexion/Ext  Wrist pronation  Wrist supination  Wrist ulnar deviation  Wrist radial dev X   Power web 3x10 red  X                 Other:      Treatment Charges: Mins Units   []  Modalities:      []  Ultrasound     [x]  Ther Exercise 35 2   [x]  Manual Therapy 10 1   []  Ther Activities     []  Orthotic fit/train     []  Orthotic recheck     []  Other     Total Treatment time 45 mins          Assessment: [x] Progressing toward goals. Began tx w/ soft tissue mobilization to the dorsum of the R forearm. Introduced progressive stretching for the extensor bulk. Pt completed eccentric loading w/ wrist curl.  Completed

## 2023-05-22 ENCOUNTER — HOSPITAL ENCOUNTER (OUTPATIENT)
Dept: OCCUPATIONAL THERAPY | Age: 43
Setting detail: THERAPIES SERIES
Discharge: HOME | End: 2023-05-22
Payer: MEDICAID

## 2023-05-22 NOTE — PROGRESS NOTES
[address]   OCCUPATIONAL THERAPY MISSED TREATMENT NOTE   OUTPATIENT   Date: 23  Patient Name: Sp Martin       MRN: [id number]   Account #: [de-identified]    : 1980  (36 y.o.)  Gender: male   Diagnosis: R hand pain M79.641; De Quervain's tenosynovitis, right M65.4  OT Visit Information  OT Insurance Information: [employer] [place] Medicaid  Total # of Visits Approved: 8  Canceled Appointment: 1         REASON FOR MISSED TREATMENT:  Pt called and cancel therapy today \"stating that he couldn't make it\" per .             Electronically signed by Reginaldo Jansen OT on 23 at 9:43 AM EDT

## 2023-05-25 ENCOUNTER — HOSPITAL ENCOUNTER (OUTPATIENT)
Dept: OCCUPATIONAL THERAPY | Age: 43
Setting detail: THERAPIES SERIES
Discharge: HOME | End: 2023-05-25
Payer: MEDICAID

## 2023-05-25 PROCEDURE — 97110 THERAPEUTIC EXERCISES: CPT

## 2023-05-25 PROCEDURE — 97140 MANUAL THERAPY 1/> REGIONS: CPT

## 2023-05-25 NOTE — FLOWSHEET NOTE
[] [place]       Occupational Therapy            1st floor       [address] [place], [place]         Phone: [phone]       Fax: [phone]  [x] [facility] ([facility]) @ [facility]  [address] [facility] [address]  [place], [place] [facility]  Phone [phone]  Fax [phone]     Occupational Therapy Daily Treatment Note    Date:  2023  Patient Name:  Jose Carter    :  1980  MRN: [id number]  Physician: Juvenal Guzmán     Insurance: [facility] MEDICAID  Diagnosis: Right hand pain [M79.641], De Quervain's tenosynovitis, right [M65.4] R hand pain M79.641; De Quervain's tenosynovitis, right M65. Onset Date:    Next Dr. [address]: TBD  Visit# / total visits: ; Progress note for Medicare patient due at visit 8-9    Cancels/No Shows: 0/0      Subjective:    Pain:  [x] Yes  [] No Location: Dorsum of R hand   Pain Rating: (0-10 scale) 6/10  Pain altered Tx:  [x] No  [] Yes  Action:  Pt Comments: Pt reports his pn has gotten worse since doing the [address]. Pt states tomorrow is his last day at work secondary to the pn in his R hand. Objective:  Modalities:  Precautions:  Exercises:  EXERCISE    REPS/     TIME  WEIGHT/    LEVEL COMMENTS Completed   Progressive extensor wad stretching  5x10 seconds AROM  X   Eccentric wrist curl 2x10 2lbs  X   Pronation/supination  3x10 Hammer   X   Flexbar   3x10 red Wrist Flexion/Ext  Wrist pronation  Wrist supination  Wrist ulnar deviation  Wrist radial dev X   Power web 3x10 red  X                 Other:      Treatment Charges: Mins Units   []  Modalities:      []  Ultrasound     [x]  Ther Exercise 30 2   [x]  Manual Therapy 10 1   []  Ther Activities     []  Orthotic fit/train     []  Orthotic recheck     []  Other     Total Treatment time 40 mins        Assessment: [x] Progressing toward goals. Began tx w/ soft tissue mobilization to the extensor bulk of the R forearm.  Cont w/ extensor bulk progressive stretching for

## 2023-06-07 ENCOUNTER — HOSPITAL ENCOUNTER (OUTPATIENT)
Dept: OCCUPATIONAL THERAPY | Age: 43
Setting detail: THERAPIES SERIES
Discharge: HOME | End: 2023-06-07
Payer: MEDICAID

## 2023-06-07 PROCEDURE — 97110 THERAPEUTIC EXERCISES: CPT

## 2023-06-07 ASSESSMENT — PAIN DESCRIPTION - LOCATION: LOCATION: HAND;WRIST

## 2023-06-07 ASSESSMENT — PAIN SCALES - GENERAL: PAINLEVEL_OUTOF10: 3

## 2023-06-07 ASSESSMENT — PAIN DESCRIPTION - PAIN TYPE: TYPE: ACUTE PAIN

## 2023-06-07 ASSESSMENT — PAIN DESCRIPTION - DESCRIPTORS: DESCRIPTORS: ACHING

## 2023-06-07 ASSESSMENT — PAIN DESCRIPTION - ORIENTATION: ORIENTATION: RIGHT

## 2023-06-16 ENCOUNTER — TELEPHONE (OUTPATIENT)
Dept: ORTHOPEDIC SURGERY | Age: 43
End: 2023-06-16

## 2023-06-16 DIAGNOSIS — M65.4 DE QUERVAIN'S TENOSYNOVITIS, RIGHT: Primary | ICD-10-CM

## 2023-06-19 RX ORDER — MELOXICAM 15 MG/1
15 TABLET ORAL DAILY
Qty: 30 TABLET | Refills: 0 | Status: SHIPPED | OUTPATIENT
Start: 2023-06-19

## 2023-06-30 ENCOUNTER — HOSPITAL ENCOUNTER (OUTPATIENT)
Dept: MRI IMAGING | Age: 43
End: 2023-06-30
Payer: MEDICAID

## 2023-06-30 DIAGNOSIS — M79.641 RIGHT HAND PAIN: ICD-10-CM

## 2023-06-30 PROCEDURE — 73218 MRI UPPER EXTREMITY W/O DYE: CPT

## 2023-07-05 ENCOUNTER — TELEPHONE (OUTPATIENT)
Dept: ORTHOPEDIC SURGERY | Age: 43
End: 2023-07-05

## 2023-07-05 DIAGNOSIS — M79.641 RIGHT HAND PAIN: Primary | ICD-10-CM

## 2023-07-05 NOTE — TELEPHONE ENCOUNTER
Spoke with patient on results he voiced understanding. Referral provided to [facility] orthopedics  as requested provided phone number for office.   Faxed office note mri results face sheet to [phone]

## 2023-07-05 NOTE — TELEPHONE ENCOUNTER
----- Message from [place], [place] sent at 7/5/2023  8:07 AM EDT -----  Evidence of stress reaction to the distal third metacarpal.  Flexor tenosynovitis of the thumb and fifth digit possible tearing of the extensor carpi ulnaris on the pinky side of the hand. Given patient's failed respond to conservative management recommend referral to Dr. Silver Rosenberg for further evaluation.

## 2023-07-05 NOTE — TELEPHONE ENCOUNTER
Pt wife Venice Jimbo called in to see if there was any updates on the MRI done on her husbands rt hand done on 06/30/23    She is listed on current HIPAA please call to update @ [phone]

## 2024-04-24 ENCOUNTER — OFFICE VISIT (OUTPATIENT)
Dept: ORTHOPEDIC SURGERY | Age: 44
End: 2024-04-24
Payer: MEDICAID

## 2024-04-24 VITALS — WEIGHT: 205 LBS | RESPIRATION RATE: 16 BRPM | BODY MASS INDEX: 27.17 KG/M2 | HEIGHT: 73 IN

## 2024-04-24 DIAGNOSIS — S56.419A STRAIN OF EXTENSOR DIGITORUM TENDON: Primary | ICD-10-CM

## 2024-04-24 DIAGNOSIS — M79.632 LEFT FOREARM PAIN: ICD-10-CM

## 2024-04-24 DIAGNOSIS — M65.4 DE QUERVAIN'S TENOSYNOVITIS, RIGHT: ICD-10-CM

## 2024-04-24 PROCEDURE — 99214 OFFICE O/P EST MOD 30 MIN: CPT | Performed by: PHYSICIAN ASSISTANT

## 2024-04-24 NOTE — PROGRESS NOTES
was generated using voice recognition Dragon dictation software.  Although every effort was made to ensure the accuracy of this automated transcription, some errors in transcription may have occurred.    
No

## 2024-05-08 ENCOUNTER — OFFICE VISIT (OUTPATIENT)
Dept: ORTHOPEDIC SURGERY | Age: 44
End: 2024-05-08
Payer: MEDICAID

## 2024-05-08 VITALS — RESPIRATION RATE: 14 BRPM | HEIGHT: 73 IN | WEIGHT: 205 LBS | BODY MASS INDEX: 27.17 KG/M2

## 2024-05-08 DIAGNOSIS — G89.29 CHRONIC HAND PAIN, RIGHT: Primary | ICD-10-CM

## 2024-05-08 DIAGNOSIS — M77.12 LATERAL EPICONDYLITIS OF LEFT ELBOW: ICD-10-CM

## 2024-05-08 DIAGNOSIS — M79.641 CHRONIC HAND PAIN, RIGHT: Primary | ICD-10-CM

## 2024-05-08 PROCEDURE — 99213 OFFICE O/P EST LOW 20 MIN: CPT | Performed by: PHYSICIAN ASSISTANT

## 2024-05-08 RX ORDER — METHYLPREDNISOLONE 4 MG/1
4 TABLET ORAL SEE ADMIN INSTRUCTIONS
Qty: 1 KIT | Refills: 0 | Status: SHIPPED | OUTPATIENT
Start: 2024-05-08 | End: 2024-05-14

## 2024-05-08 NOTE — PROGRESS NOTES
sensory deficit.  Skin: Skin is warm and dry  Psychiatric: Behavior is normal. Thought content normal.  Nursing note and vitals reviewed.     Labs and Imaging:     XR taken today:  No results found.     X-rays taken in clinic previously and preliminarily reviewed by me 4/24/24:  3 views of the left hand demonstrates overall good osseous alignment no evidence of acute fracture, significant degenerative changes.  No evidence of dislocation of the wrist or carpals.  IP joints and MP joints appear intact without significant degenerative changes    Assessment and Plan:  1. Chronic hand pain, right    2. Lateral epicondylitis of left elbow              PLAN:  This is a 43 y.o. male who presents to the clinic today for follow up left forearm and elbow pain that started suddenly after a twisting type injury while using a tire iron at the end of March.  Examination was initially concerning for extensor tendon strain of the wrist with questionable lateral epicondylar pain at that time concerning for traumatic lateral epicondylitis.  1.  Despite activity modification and wrist bracing patient has had minimal improvement of this left arm  2.  We did discuss the possibility for lateral epicondylar injection today versus initiation of Medrol Dosepak and physical therapy  3. After discussion patient elected proceed with Medrol Dosepak and PT referral  4.  Patient with chronic right wrist and right hand pain for which she sought hand surgery at [facility] last year unfortunate this continues to be present so he is requesting referral for second opinion on this chronic right hand and wrist pain, and referral to [facility] orthopedics is provided for his second opinion and further evaluation of right hand and wrist pain  5.  We will have patient contact our office should he continue to be painful on the left forearm and elbow would advise further diagnostic evaluation with MRI of the left elbow should patient fail to respond to

## 2024-05-21 ENCOUNTER — OFFICE VISIT (OUTPATIENT)
Age: 44
End: 2024-05-21
Payer: MEDICAID

## 2024-05-21 VITALS — HEIGHT: 72 IN | BODY MASS INDEX: 27.77 KG/M2 | WEIGHT: 205 LBS

## 2024-05-21 DIAGNOSIS — M79.642 LEFT HAND PAIN: Primary | ICD-10-CM

## 2024-05-21 PROCEDURE — 99204 OFFICE O/P NEW MOD 45 MIN: CPT | Performed by: ORTHOPAEDIC SURGERY

## 2024-05-21 NOTE — PROGRESS NOTES
[facility]      [facility] PHYSIC[facility]ANS [facility]  [facility]  [address]  [place] [address]  Dept: [phone]  Dept Fax: [phone]    Chief Compliant:  Chief Complaint   Patient presents with    Follow-up     L hand injury        History of Present Illness:  This is a 43 y.o. male who presents to the clinic today for evaluation / follow up of bilateral hand pain he states that he was using a long wrench to remove a tire and it recoiled and vibrated on him.  Nothing hit his arm but he states that it did cause pain in the forearm.  That was about a month ago.  He states it really is no better.  Denies any numbness or tingling.  He also had a question about the right hand for which she was seen at [facility].  They had an MRI that showed a stress fracture of the third metacarpal.  He states that it still very weak.  He has been in splints for both the sides.       Physical Exam:    On examination there is no abnormalities about either side he does state that is tender over the forearm of the extensor muscles of the left side.  No real tenderness over the right hand.  He has sensation intact light touch cap if less than 2 seconds he has full wrist range of motion with no tenderness about the elbow or the wrist on the left side.    Nursing note and vitals reviewed.     Labs and Imaging: X-rays of the left hand reviewed did not show any acute process.  MRI of the right hand was reviewed from earlier this month that they mentioned a stress fracture of the third metacarpal.    XR taken today:  No results found.      No orders of the defined types were placed in this encounter.      Assessment and Plan:  No diagnosis found.      This is a 43 y.o. male with I discussed him at this time I think the right side should be healing up nicely.  From the stress fracture.  I think he can wean himself out of the brace and start more